# Patient Record
Sex: FEMALE | Race: WHITE | NOT HISPANIC OR LATINO | Employment: OTHER | URBAN - METROPOLITAN AREA
[De-identification: names, ages, dates, MRNs, and addresses within clinical notes are randomized per-mention and may not be internally consistent; named-entity substitution may affect disease eponyms.]

---

## 2023-07-09 ENCOUNTER — APPOINTMENT (OUTPATIENT)
Dept: RADIOLOGY | Facility: CLINIC | Age: 83
End: 2023-07-09
Attending: EMERGENCY MEDICINE
Payer: MEDICARE

## 2023-07-09 ENCOUNTER — APPOINTMENT (OUTPATIENT)
Dept: CT IMAGING | Facility: CLINIC | Age: 83
End: 2023-07-09
Attending: EMERGENCY MEDICINE
Payer: MEDICARE

## 2023-07-09 ENCOUNTER — HOSPITAL ENCOUNTER (EMERGENCY)
Facility: CLINIC | Age: 83
Discharge: HOME | End: 2023-07-09
Attending: EMERGENCY MEDICINE | Admitting: EMERGENCY MEDICINE
Payer: MEDICARE

## 2023-07-09 VITALS
BODY MASS INDEX: 23.74 KG/M2 | RESPIRATION RATE: 16 BRPM | OXYGEN SATURATION: 97 % | TEMPERATURE: 97.4 F | SYSTOLIC BLOOD PRESSURE: 139 MMHG | DIASTOLIC BLOOD PRESSURE: 62 MMHG | HEIGHT: 62 IN | WEIGHT: 129 LBS | HEART RATE: 61 BPM

## 2023-07-09 DIAGNOSIS — I95.9 HYPOTENSION, UNSPECIFIED HYPOTENSION TYPE: ICD-10-CM

## 2023-07-09 DIAGNOSIS — R55 SYNCOPE, UNSPECIFIED SYNCOPE TYPE: ICD-10-CM

## 2023-07-09 LAB
ALBUMIN SERPL-MCNC: 3.9 G/DL (ref 3.5–5)
ALP SERPL-CCNC: 61 U/L (ref 45–120)
ALT SERPL W P-5'-P-CCNC: 16 U/L (ref 0–45)
ANION GAP SERPL CALCULATED.3IONS-SCNC: 10 MMOL/L (ref 5–18)
APTT PPP: 27 SECONDS (ref 22–38)
AST SERPL W P-5'-P-CCNC: 24 U/L (ref 0–40)
ATRIAL RATE - MUSE: 67 BPM
BASOPHILS # BLD AUTO: 0 10E3/UL (ref 0–0.2)
BASOPHILS NFR BLD AUTO: 1 %
BILIRUB SERPL-MCNC: 0.5 MG/DL (ref 0–1)
BUN SERPL-MCNC: 37 MG/DL (ref 8–28)
CALCIUM SERPL-MCNC: 9.5 MG/DL (ref 8.5–10.5)
CHLORIDE BLD-SCNC: 100 MMOL/L (ref 98–107)
CO2 SERPL-SCNC: 25 MMOL/L (ref 22–31)
CREAT SERPL-MCNC: 2.06 MG/DL (ref 0.6–1.1)
DIASTOLIC BLOOD PRESSURE - MUSE: NORMAL MMHG
EOSINOPHIL # BLD AUTO: 0.1 10E3/UL (ref 0–0.7)
EOSINOPHIL NFR BLD AUTO: 3 %
ERYTHROCYTE [DISTWIDTH] IN BLOOD BY AUTOMATED COUNT: 11.8 % (ref 10–15)
GFR SERPL CREATININE-BSD FRML MDRD: 23 ML/MIN/1.73M2
GLUCOSE BLD-MCNC: 173 MG/DL (ref 70–125)
HCT VFR BLD AUTO: 34.3 % (ref 35–47)
HGB BLD-MCNC: 11.5 G/DL (ref 11.7–15.7)
IMM GRANULOCYTES # BLD: 0 10E3/UL
IMM GRANULOCYTES NFR BLD: 0 %
INR PPP: 1.05 (ref 0.85–1.15)
INTERPRETATION ECG - MUSE: NORMAL
LYMPHOCYTES # BLD AUTO: 0.7 10E3/UL (ref 0.8–5.3)
LYMPHOCYTES NFR BLD AUTO: 17 %
MAGNESIUM SERPL-MCNC: 2 MG/DL (ref 1.8–2.6)
MCH RBC QN AUTO: 29.9 PG (ref 26.5–33)
MCHC RBC AUTO-ENTMCNC: 33.5 G/DL (ref 31.5–36.5)
MCV RBC AUTO: 89 FL (ref 78–100)
MONOCYTES # BLD AUTO: 0.5 10E3/UL (ref 0–1.3)
MONOCYTES NFR BLD AUTO: 13 %
NEUTROPHILS # BLD AUTO: 2.6 10E3/UL (ref 1.6–8.3)
NEUTROPHILS NFR BLD AUTO: 66 %
NRBC # BLD AUTO: 0 10E3/UL
NRBC BLD AUTO-RTO: 0 /100
P AXIS - MUSE: 82 DEGREES
PLATELET # BLD AUTO: 240 10E3/UL (ref 150–450)
POTASSIUM BLD-SCNC: 4.3 MMOL/L (ref 3.5–5)
PR INTERVAL - MUSE: 224 MS
PROT SERPL-MCNC: 7.8 G/DL (ref 6–8)
QRS DURATION - MUSE: 70 MS
QT - MUSE: 390 MS
QTC - MUSE: 412 MS
R AXIS - MUSE: 77 DEGREES
RBC # BLD AUTO: 3.85 10E6/UL (ref 3.8–5.2)
SODIUM SERPL-SCNC: 135 MMOL/L (ref 136–145)
SYSTOLIC BLOOD PRESSURE - MUSE: NORMAL MMHG
T AXIS - MUSE: 52 DEGREES
TROPONIN I SERPL-MCNC: <0.01 NG/ML (ref 0–0.29)
VENTRICULAR RATE- MUSE: 67 BPM
WBC # BLD AUTO: 3.9 10E3/UL (ref 4–11)

## 2023-07-09 PROCEDURE — 70450 CT HEAD/BRAIN W/O DYE: CPT | Mod: MA

## 2023-07-09 PROCEDURE — 87086 URINE CULTURE/COLONY COUNT: CPT | Performed by: EMERGENCY MEDICINE

## 2023-07-09 PROCEDURE — 85610 PROTHROMBIN TIME: CPT | Performed by: EMERGENCY MEDICINE

## 2023-07-09 PROCEDURE — 84484 ASSAY OF TROPONIN QUANT: CPT | Performed by: EMERGENCY MEDICINE

## 2023-07-09 PROCEDURE — 93005 ELECTROCARDIOGRAM TRACING: CPT | Performed by: EMERGENCY MEDICINE

## 2023-07-09 PROCEDURE — 99285 EMERGENCY DEPT VISIT HI MDM: CPT | Mod: 25

## 2023-07-09 PROCEDURE — 85730 THROMBOPLASTIN TIME PARTIAL: CPT | Performed by: EMERGENCY MEDICINE

## 2023-07-09 PROCEDURE — 36415 COLL VENOUS BLD VENIPUNCTURE: CPT | Performed by: EMERGENCY MEDICINE

## 2023-07-09 PROCEDURE — 80053 COMPREHEN METABOLIC PANEL: CPT | Performed by: EMERGENCY MEDICINE

## 2023-07-09 PROCEDURE — 85025 COMPLETE CBC W/AUTO DIFF WBC: CPT | Performed by: EMERGENCY MEDICINE

## 2023-07-09 PROCEDURE — 71045 X-RAY EXAM CHEST 1 VIEW: CPT

## 2023-07-09 PROCEDURE — 83735 ASSAY OF MAGNESIUM: CPT | Performed by: EMERGENCY MEDICINE

## 2023-07-09 ASSESSMENT — ENCOUNTER SYMPTOMS
WEAKNESS: 0
VOMITING: 0
LIGHT-HEADEDNESS: 0
HEMATURIA: 0
CHEST TIGHTNESS: 0
FREQUENCY: 0
NUMBNESS: 0
NAUSEA: 1
DYSURIA: 0
SHORTNESS OF BREATH: 0
ROS GI COMMENTS: POSITIVE FOR BELCHING.

## 2023-07-09 ASSESSMENT — ACTIVITIES OF DAILY LIVING (ADL)
ADLS_ACUITY_SCORE: 35
ADLS_ACUITY_SCORE: 33

## 2023-07-09 NOTE — DISCHARGE INSTRUCTIONS
Head CT scan, EKG, and laboratory test all appear reassuring here today in the ED.  Although the exact cause of your symptoms was unclear, orthostatic hypotension (low blood pressure) likely due to your new blood pressure medication is suspected at this time.      Please use caution when standing over the next few days to ensure that you do not have a repeat syncopal episode.      Please contact your nephrologist tomorrow to discuss your new blood pressure medication and whether or not you should stay on it.  Return back to ED sooner for any repeat syncopal episodes or for any other new or concerning symptoms.

## 2023-07-09 NOTE — ED TRIAGE NOTES
Pt here after having a syncopal or near syncopal episode while in [employer] today she fainted and her  caught her. Pt had bp meds changed by kidney doctor on Friday and started new medicines this am. Took one hour before syncopal episode. Pt also scheduled for ECHO in near future for possible heart murmur.

## 2023-07-09 NOTE — ED PROVIDER NOTES
"EMERGENCY DEPARTMENT ENCOUNTER      NAME: Maddi Coelho  AGE: 82 year old female  YOB: 1940  MRN: [id number]  EVALUATION DATE & TIME: No admission date for patient encounter.    PCP: Ken Oscar    ED PROVIDER: Elsa Ramos DO      Chief Complaint   Patient presents with     Syncope         FINAL IMPRESSION:  1. Syncope, unspecified syncope type    2. Hypotension, unspecified hypotension type          ED COURSE & MEDICAL DECISION MAKIN-year-old female with history of CKD, TIA, and documented whitecoat hypertension presented to the ED for evaluation after experiencing a syncopal episode that occurred while at [employer] just prior to ED arrival.  The patient reported that she took a new blood pressure medication and diuretic for the first time approximately 1 hour prior to the episode.  Other than some \"belching\" prior to the single episode the patient had no symptoms.  Here in the ED the patient states that she feels back to normal.  Upon arrival to the ED the patient was hemodynamically stable.  She did not appear to be in any obvious distress or discomfort at the time for initial evaluation.  The patient's physical exam was unremarkable and there were no neurologic deficits noted.    An EKG was obtained which revealed normal sinus rhythm without any  concerning ST or T wave changes.    The patient's orthostatic vital signs were checked here in the ED.  The systolic pressure was 142 with sitting.  This dropped down to 111 with standing.      CBC, CMP, magnesium, and troponin were all reassuring or unchanged from baseline.  Head CT scan and chest x-ray were both nondiagnostic as well.   Only enough urine was obtained to run a urine culture which is currently pending.    The patient was reevaluated and informed of the reassuring lab and imaging results.  Patient was informed that her symptoms are likely due to orthostatic hypotension which is likely secondary to her new blood pressure medication.  " The fact that the patient may be slightly dehydrated as well likely contributed to the orthostatic hypotension.  After educating and reassuring the patient both she and her  felt comfortable returning home.  The patient was instructed to contact her nephrologist tomorrow to discuss whether or not she should continue taking the blood pressure medication.  The patient was instructed to return back to ED sooner for any worsening syncopal episodes, dizziness, chest pain or pressure, shortness of breath, or any other new or concerning symptoms.    Pertinent Labs & Imaging studies reviewed. (See chart for details)  8:53 AM I met with the patient to gather history and to perform my initial exam. We discussed plans for the ED course, including diagnostic testing and treatment.   11:27 AM Patient ready for discharge.      At the conclusion of the encounter I discussed the results of all of the tests and the disposition. The questions were answered. The patient or family acknowledged understanding and was agreeable with the care plan.     Medical Decision Making    History:    Supplemental history from: Documented in chart, if applicable and Family Member/Significant Other    External Record(s) reviewed: Documented in chart, if applicable.    Work Up:    Chart documentation includes differential considered and any EKGs or imaging independently interpreted by provider, where specified.    In additional to work up documented, I considered the following work up: Documented in chart, if applicable.    External consultation:    Discussion of management with another provider: Documented in chart, if applicable    Complicating factors:    Care impacted by chronic illness: Chronic Kidney Disease    Care affected by social determinants of health: N/A    Disposition considerations: Discharge. No recommendations on prescription strength medication(s). I considered admission, but discharged patient after significant clinical  "improvement.      PPE worn: n95 mask, goggles    MEDICATIONS GIVEN IN THE EMERGENCY:  Medications - No data to display    NEW PRESCRIPTIONS STARTED AT TODAY'S ER VISIT  There are no discharge medications for this patient.         =================================================================    HPI    Patient information was obtained from: Patient and     Use of : N/A          Maddi Coelho is a 82 year old female with a pertinent history of TIA, Stage 3b CKD, colorectal cancer, and hyperlipidemia, who presents to this ED via walk-in with her  for evaluation of syncope.    Patient reports she woke up this morning feeling well, and had no symptoms when she got to [employer]. She reports that while she was standing in [employer], she had a near syncopal episode. Her  notes she did not fully lose consciousness, he caught her and sat her in a chair. Patient notes the only symptom prior to the episode was \"belching.\" No chest pain, chest pressure, or shortness of breath. No nausea or vomiting prior. No new numbness, tingling, weakness, or urinary complaints. Patient notes she was recently started on new medications for blood pressure and a water tablet.  notes the patient was disoriented for a minute after the episode and she has a history of TIA. Patient notes she had nausea as they were leaving the [employer].     notes the patient was \"white coat hypertension.\" Her blood pressure is usually in the 180s in the office, but she checks her blood pressure at home daily, and it is usually in the 130s. They deny any other current complaints.      REVIEW OF SYSTEMS   Review of Systems   Respiratory: Negative for chest tightness and shortness of breath.    Cardiovascular: Negative for chest pain.   Gastrointestinal: Positive for nausea. Negative for vomiting.        Positive for belching.   Genitourinary: Negative for dysuria, frequency and hematuria.   Neurological: Negative for weakness, " "light-headedness and numbness.        Positive for near syncope.   All other systems reviewed and are negative.       PAST MEDICAL HISTORY:  No past medical history on file.    PAST SURGICAL HISTORY:  No past surgical history on file.        CURRENT MEDICATIONS:    No current outpatient medications on file.      ALLERGIES:  Allergies   Allergen Reactions     Sulfa Antibiotics Hives     Erythromycin        FAMILY HISTORY:  No family history on file.    SOCIAL HISTORY:   Social History     Socioeconomic History     Marital status:        VITALS:  /62   Pulse 61   Temp 97.4  F (36.3  C)   Resp 16   Ht 1.562 m (5' 1.5\")   Wt 58.5 kg (129 lb)   SpO2 97%   BMI 23.98 kg/m      PHYSICAL EXAM    General presentation: Alert, Vital signs reviewed. NAD  HENT: ENT inspection is normal. Oropharynx is moist and clear.   Eye: Pupils are equal and reactive to light. EOMI  Neck: The neck is supple, with full ROM, with no evidence of meningismus.  Pulmonary: Currently in no acute respiratory distress. Normal, non labored respirations, the lung sounds are normal with good equal air movement. Clear to auscultation bilaterally.   Circulatory: Regular rate and rhythm. Peripheral pulses are strong and equal. No murmurs, rubs, or gallops.   Abdominal: The abdomen is soft. Nontender. No rigidity, guarding, or rebound. Bowel sounds normal.   Neurologic: Alert, oriented to person, place, and time. No motor deficit. No sensory deficit. Cranial nerves II through XII are intact.  Musculoskeletal: No extremity tenderness. Full range of motion in all extremities. No extremity edema.   Skin: Skin color is normal. No rash. Warm. Dry to touch.      LAB:  All pertinent labs reviewed and interpreted.  Results for orders placed or performed during the hospital encounter of 07/09/23   XR Chest Port 1 View    Impression    IMPRESSION: Small hiatal hernia. Cardiac silhouette within normal limits. Atherosclerotic aorta. No pneumothorax or " pleural effusion. No focal consolidation.   Head CT w/o contrast    Impression    IMPRESSION:  1.  No acute intracranial process.  2.  Mild chronic small vessel ischemic disease.   Result Value Ref Range    INR 1.05 0.85 - 1.15   Partial thromboplastin time   Result Value Ref Range    aPTT 27 22 - 38 Seconds   Comprehensive metabolic panel   Result Value Ref Range    Sodium 135 (L) 136 - 145 mmol/L    Potassium 4.3 3.5 - 5.0 mmol/L    Chloride 100 98 - 107 mmol/L    Carbon Dioxide (CO2) 25 22 - 31 mmol/L    Anion Gap 10 5 - 18 mmol/L    Urea Nitrogen 37 (H) 8 - 28 mg/dL    Creatinine 2.06 (H) 0.60 - 1.10 mg/dL    Calcium 9.5 8.5 - 10.5 mg/dL    Glucose 173 (H) 70 - 125 mg/dL    Alkaline Phosphatase 61 45 - 120 U/L    AST 24 0 - 40 U/L    ALT 16 0 - 45 U/L    Protein Total 7.8 6.0 - 8.0 g/dL    Albumin 3.9 3.5 - 5.0 g/dL    Bilirubin Total 0.5 0.0 - 1.0 mg/dL    GFR Estimate 23 (L) >60 mL/min/1.73m2   Result Value Ref Range    Magnesium 2.0 1.8 - 2.6 mg/dL   Result Value Ref Range    Troponin I <0.01 0.00 - 0.29 ng/mL   CBC with platelets and differential   Result Value Ref Range    WBC Count 3.9 (L) 4.0 - 11.0 10e3/uL    RBC Count 3.85 3.80 - 5.20 10e6/uL    Hemoglobin 11.5 (L) 11.7 - 15.7 g/dL    Hematocrit 34.3 (L) 35.0 - 47.0 %    MCV 89 78 - 100 fL    MCH 29.9 26.5 - 33.0 pg    MCHC 33.5 31.5 - 36.5 g/dL    RDW 11.8 10.0 - 15.0 %    Platelet Count 240 150 - 450 10e3/uL    % Neutrophils 66 %    % Lymphocytes 17 %    % Monocytes 13 %    % Eosinophils 3 %    % Basophils 1 %    % Immature Granulocytes 0 %    NRBCs per 100 WBC 0 <1 /100    Absolute Neutrophils 2.6 1.6 - 8.3 10e3/uL    Absolute Lymphocytes 0.7 (L) 0.8 - 5.3 10e3/uL    Absolute Monocytes 0.5 0.0 - 1.3 10e3/uL    Absolute Eosinophils 0.1 0.0 - 0.7 10e3/uL    Absolute Basophils 0.0 0.0 - 0.2 10e3/uL    Absolute Immature Granulocytes 0.0 <=0.4 10e3/uL    Absolute NRBCs 0.0 10e3/uL   ECG 12-LEAD WITH MUSE (LHE)   Result Value Ref Range    Systolic Blood  Pressure  mmHg    Diastolic Blood Pressure  mmHg    Ventricular Rate 67 BPM    Atrial Rate 67 BPM    AZ Interval 224 ms    QRS Duration 70 ms     ms    QTc 412 ms    P Axis 82 degrees    R AXIS 77 degrees    T Axis 52 degrees    Interpretation ECG       Sinus rhythm with 1st degree A-V block  Low voltage QRS  Borderline ECG  No previous ECGs available         RADIOLOGY:  Reviewed all pertinent imaging. Please see official radiology report.  XR Chest Port 1 View   Final Result   IMPRESSION: Small hiatal hernia. Cardiac silhouette within normal limits. Atherosclerotic aorta. No pneumothorax or pleural effusion. No focal consolidation.      Head CT w/o contrast   Final Result   IMPRESSION:   1.  No acute intracranial process.   2.  Mild chronic small vessel ischemic disease.          EKG:    Normal sinus rhythm.  Rate of 67.  First-degree AV block.  Normal QRS.  Normal QT.  No ST or T wave changes.  No previous EKG available for comparison.    I have independently reviewed and interpreted the EKG(s) documented above.        I, Zeinab Moss , am serving as a scribe to document services personally performed by Elsa Ramos DO based on my observation and the provider's statements to me. I, Elsa Ramos, attest that Zeinab Moss is acting in a scribe capacity, has observed my performance of the services and has documented them in accordance with my direction.    Elsa Ramos DO  Emergency Medicine  [facility] EMERGENCY ROOM  [address] [address] [phone]     Elsa Ramos DO  07/09/23 [id number]

## 2023-07-11 LAB — BACTERIA UR CULT: NORMAL

## 2025-04-29 ENCOUNTER — HOSPITAL ENCOUNTER (EMERGENCY)
Facility: CLINIC | Age: 85
Discharge: HOME | End: 2025-04-29
Attending: STUDENT IN AN ORGANIZED HEALTH CARE EDUCATION/TRAINING PROGRAM
Payer: MEDICARE

## 2025-04-29 VITALS
RESPIRATION RATE: 20 BRPM | HEART RATE: 102 BPM | TEMPERATURE: 97.1 F | HEIGHT: 61 IN | SYSTOLIC BLOOD PRESSURE: 208 MMHG | BODY MASS INDEX: 21.71 KG/M2 | DIASTOLIC BLOOD PRESSURE: 85 MMHG | OXYGEN SATURATION: 96 % | WEIGHT: 115 LBS

## 2025-04-29 DIAGNOSIS — R04.0 EPISTAXIS: ICD-10-CM

## 2025-04-29 PROCEDURE — 99284 EMERGENCY DEPT VISIT MOD MDM: CPT | Mod: 25

## 2025-04-29 PROCEDURE — 30903 CONTROL OF NOSEBLEED: CPT

## 2025-04-29 ASSESSMENT — ACTIVITIES OF DAILY LIVING (ADL)
ADLS_ACUITY_SCORE: 41
ADLS_ACUITY_SCORE: 41

## 2025-04-29 ASSESSMENT — COLUMBIA-SUICIDE SEVERITY RATING SCALE - C-SSRS
6. HAVE YOU EVER DONE ANYTHING, STARTED TO DO ANYTHING, OR PREPARED TO DO ANYTHING TO END YOUR LIFE?: NO
1. IN THE PAST MONTH, HAVE YOU WISHED YOU WERE DEAD OR WISHED YOU COULD GO TO SLEEP AND NOT WAKE UP?: NO
2. HAVE YOU ACTUALLY HAD ANY THOUGHTS OF KILLING YOURSELF IN THE PAST MONTH?: NO

## 2025-04-29 NOTE — ED PROVIDER NOTES
"EMERGENCY DEPARTMENT ENCOUNTER      NAME: Maddi Coelho  AGE: 84 year old female  YOB: 1940  MRN: [id number]  EVALUATION DATE & TIME: 4/29/2025  6:01 PM    PCP: Ken Oscar    ED PROVIDER: Irving Stover M.D.      Chief Complaint   Patient presents with    Epistaxis         FINAL IMPRESSION:  No diagnosis found.      ED COURSE & MEDICAL DECISION MAKING:    Pertinent Labs & Imaging studies reviewed. (See chart for details)  84 year old female presents to the Emergency Department for evaluation of ***    At the conclusion of the encounter I discussed the results of all of the tests and the disposition. The questions were answered. The patient or family acknowledged understanding and was agreeable with the care plan.            6:46 PM I met with the patient, obtained history, performed an initial exam, and discussed options and plan for diagnostics and treatment here in the ED.        Medical Decision Making  {DID YOU REMEMBER TO DOCUMENT...?:453790}  {ADMIT VS D/C:277672}    MIPS (CTPE, Dental pain, Dickey, Sinusitis, Asthma/COPD, Head Trauma): {ECC MIPS DOCUMENTATION:922492}    SEPSIS: {Sepsis/Stemi/Stroke:747341::\"None\"}                This patient involved a high degree of complexity in medical decision making, as significant risks were present and assessed. Recent encounters & results in medical record reviewed by me.***     All workup (i.e. any EKG/labs/imaging as per charting below) reviewed and independently interpreted by me. See respective sections for details.      *** minutes of critical care time     MEDICATIONS GIVEN IN THE EMERGENCY:  Medications - No data to display    NEW PRESCRIPTIONS STARTED AT TODAY'S ER VISIT  New Prescriptions    No medications on file          =================================================================    HPI    Patient information was obtained from: patient and     Use of : N/A        Maddi Coelho is a 84 year old female with a pertinent " history of colorectal cancer, TIA, stenosis renal artery, hypertension, uterine cancer, CKD - stage 4, and epistaxis who presents for evaluation of epistaxis.    Patient reports sudden onset of nose bleed that started at 3 PM today. Patient thinks she could have blown her nose to hard. She is on Plavix. Patient was seen in ED in [place] for this and needed to get her nose packed.     Patient went to  prior to arrival and they tried nasal spray to stop the bleeding which was not successful. Patient sent here for further evaluation.    Patient denies any other complaints at this time.     Per chart review, patient was seen on 4/29/25 at Urgent [facility] at [facility] for evaluation of epistaxis. Nose bleeding since 3 PM, Patient is on Plavix. Patient tried nose block with no relief. Attempted nasal spray and nasal clamp with no improvement.      REVIEW OF SYSTEMS   Review of Systems ***    PAST MEDICAL HISTORY:  No past medical history on file.    PAST SURGICAL HISTORY:  Past Surgical History:   Procedure Laterality Date    IR RENAL ANGIOGRAM BILATERAL  8/18/2023           CURRENT MEDICATIONS:    No current outpatient medications on file.      ALLERGIES:  Allergies   Allergen Reactions    Sulfa Antibiotics Hives    Erythromycin        FAMILY HISTORY:  No family history on file.    SOCIAL HISTORY:   Social History     Socioeconomic History    Marital status:      Social Drivers of Health     Financial Resource Strain: Low Risk  (11/1/2023)    Received from [facility]    Overall Financial Resource Strain (CARDIA)     Difficulty of Paying Living Expenses: Not hard at all   Food Insecurity: No Food Insecurity (4/26/2024)    Received from [facility]    Hunger Vital Sign     Worried About Running Out of Food in the Last Year: Never true     Ran Out of Food in the Last Year: Never true   Transportation Needs: No Transportation Needs (4/26/2024)    Received from [facility]    PRAPARE - Transportation      "Lack of Transportation (Medical): No     Lack of Transportation (Non-Medical): No   Physical Activity: Insufficiently Active (11/1/2023)    Received from [facility]    Exercise Vital Sign     Days of Exercise per Week: 4 days     Minutes of Exercise per Session: 30 min   Interpersonal Safety: Not At Risk (4/26/2024)    Received from [facility]    Humiliation, Afraid, Rape, and Kick questionnaire     Fear of Current or Ex-Partner: No     Emotionally Abused: No     Physically Abused: No     Sexually Abused: No   Housing Stability: Low Risk  (4/26/2024)    Received from [facility]    Housing Stability     What is your living situation today?: I have a steady place to live       VITALS:  BP (!) 231/104   Pulse 102   Temp 97.1  F (36.2  C) (Temporal)   Resp 20   Ht 1.549 m (5' 1\")   Wt 52.2 kg (115 lb)   SpO2 96%   BMI 21.73 kg/m        PHYSICAL EXAM    Constitutional: Well developed, Well nourished, NAD, GCS 15  HENT: Normocephalic, Atraumatic, Bilateral external ears normal, Oropharynx normal, mucous membranes moist, Nose normal. Neck-  Normal range of motion, No tenderness, Supple, No stridor.  Eyes: PERRL, EOMI, Conjunctiva normal, No discharge.   Respiratory: Normal breath sounds, No respiratory distress, No wheezing, Speaks full sentences easily. No cough.  Cardiovascular: Normal heart rate, Regular rhythm, No murmurs, No rubs, No gallops. Chest wall nontender.  GI:Soft, No tenderness, No masses, No flank tenderness. No rebound or guarding.   : Chaperone ***   Musculoskeletal: 2+ DP pulses. No edema.No cyanosis, No clubbing. Good range of motion in all major joints. No tenderness to palpation or major deformities noted.   Integument: Warm, Dry, No erythema, No rash. No petechiae.   Neurologic: Alert & oriented x 3,  CN 3-12 intact Normal motor function, Normal sensory function, No focal deficits noted. Normal gait. Normal finger to nose bilaterally  Psychiatric: Affect normal, Judgment normal, Mood " "normal. Cooperative.          LAB:  All pertinent labs reviewed and interpreted.  Labs Ordered and Resulted from Time of ED Arrival to Time of ED Departure - No data to display    RADIOLOGY:  Reviewed all pertinent imaging. Please see official radiology report.  No orders to display       EKG:    Performed at: ***    Impression: ***    Rate: ***  Rhythm: ***  Axis: ***  OK Interval: ***  QRS Interval: ***  QTc Interval: ***  ST Changes: ***  Comparison: ***    I have independently reviewed and interpreted the EKG(s) documented above.    PROCEDURES:   ***    [employer] Documentation:   CMS Diagnoses: {Sepsis/Septic Shock/Stemi/Stroke:958649::\"None\"}               I, Marcin Tian, am serving as a scribe to document services personally performed by Dr. Irving Stover based on my observation and the provider's statements to me. I, Irving Stover MD attest that Marcin Tian is acting in a scribe capacity, has observed my performance of the services and has documented them in accordance with my direction.    Irving Stover M.D.  Emergency Medicine  [facility] EMERGENCY ROOM  [address] [address] [phone]  Dept: [phone]    "

## 2025-04-29 NOTE — ED TRIAGE NOTES
Pt states bloody nose since 3pm, is on plavix. Pt states bleeding from right nostril, arrives with nasal clamp in place bleeding controlled. Denies trauma.   Pt sent from [facility] urgent care, BP very elevated in triage 231/104 states takes her BP meds, tried nasal spray at urgent care and did not help sent here.

## 2025-04-30 NOTE — ED NOTES
PT states that her BP is normally high at the hospital but is normal at home as she checks it ever day.